# Patient Record
Sex: MALE | Race: WHITE | NOT HISPANIC OR LATINO
[De-identification: names, ages, dates, MRNs, and addresses within clinical notes are randomized per-mention and may not be internally consistent; named-entity substitution may affect disease eponyms.]

---

## 2022-04-21 ENCOUNTER — APPOINTMENT (OUTPATIENT)
Dept: ORTHOPEDIC SURGERY | Facility: CLINIC | Age: 37
End: 2022-04-21
Payer: COMMERCIAL

## 2022-04-21 VITALS — BODY MASS INDEX: 26.11 KG/M2 | WEIGHT: 210 LBS | HEIGHT: 75 IN

## 2022-04-21 DIAGNOSIS — S93.492A SPRAIN OF OTHER LIGAMENT OF LEFT ANKLE, INITIAL ENCOUNTER: ICD-10-CM

## 2022-04-21 DIAGNOSIS — M25.572 PAIN IN LEFT ANKLE AND JOINTS OF LEFT FOOT: ICD-10-CM

## 2022-04-21 PROCEDURE — 73630 X-RAY EXAM OF FOOT: CPT | Mod: LT

## 2022-04-21 PROCEDURE — 73610 X-RAY EXAM OF ANKLE: CPT | Mod: LT

## 2022-04-21 PROCEDURE — L4361: CPT | Mod: LT

## 2022-04-21 PROCEDURE — 99204 OFFICE O/P NEW MOD 45 MIN: CPT

## 2022-04-21 RX ORDER — NAPROXEN 500 MG/1
500 TABLET ORAL
Qty: 30 | Refills: 0 | Status: ACTIVE | COMMUNITY
Start: 2022-04-21 | End: 1900-01-01

## 2022-04-21 NOTE — HISTORY OF PRESENT ILLNESS
[Sudden] : sudden [Intermittent] : intermittent [Walking] : walking [de-identified] : Mr. SPENCER is a 36 year male who presents today for evaluation of their Left ankle injury. He states that three days ago while he was jogging down in [place] he stepped on uneven pavement fell and twisted his left ankle. He noticed pain and swelling mostly on the outside aspect of his ankle. He was seen in the emergency room where X rays revealed no fracture. He was placed in a ankle wrap and was given crutches. He states that overall his pain has been improving he has most of his pain on the outside aspect of his ankle security denies any calf pain or any leg pain he denies any recurrent symptoms or history of ankle or foot sprains or injuries. He does not have any mechanical symptoms of catching or locking of the ankle or foot\par  [] : no [FreeTextEntry5] : onset of pain after foot hit a slope and he stumbled and twisted left ankle on 04/17/22. went to ER, there is no fx. \par swelling in foot, ankle swelling decreased. able to WB [de-identified] : 04/17/22

## 2022-04-21 NOTE — PHYSICAL EXAM
[de-identified] : General: Well-nourished, well developed female in no apparent distress\par Psych: Clear speech, pleasant mood and affect\par Neurological: Alert and oriented to person, place, and time\par Gait: Antalgic\par Respiratory: Normal respiratory effort\par Skin: No rashes, no lesions, no open skin, no erythema\par \par On examination of the right/left ankle and foot:\par \par Inspection: Mild swelling without ecchymosis over the lateral aspect of the ankle. No erythema noted. Hindfoot alignment is anatomic valgus.\par \par Palpation: They are tender over the anterior ankle joint line. Tenderness over the anterior lateral ankle over the area of the ATFL and CFL. They are tender medially over the distal tip of the medial malleolus and the deltoid ligament. They are also mildly tender over the lateral malleolus. There is no tenderness over the proximal, mid shaft tibia or fibula or syndesmosis. The leg compartments are soft and nontender. The calf is soft and nontender with a downgoing Mesa test. There is mild tenderness over the peroneal tendons which are stable. They are mildly tender over the distal syndesmosis. Negative compression test of the syndesmosis. Non-tender over the fibula head. No pain over the course of the Achilles, or posterior tibial tendons. Non-tender over the sinus tarsi.\par \par On examination of the foot: No tenderness over the transverse tarsal joints, TMT or the Lisfranc joints on palpation and stress examination. No tenderness over the calcaneus, navicular, cuboid, cuneiforms, or metatarsals shafts. Full range of motion through the MTP joints. No pain on examination of the toes.\par \par ROM: 5 degrees of dorsiflexion, 25 degrees of plantar flexion, 15 degrees of inversion and 10 degrees of eversion with discomfort over the lateral ankle.\par \par Muscle Strength: 4/5 strength with resisted dorsiflexion, plantar flexion, inversion and eversion with pain on resisted eversion.\par \par Stability: No instability noted with varus/valgus stress, with slight laxity of the lateral ligament complex\par \par Tests: Anterior and posterior drawer test negative. Negative syndesmotic squeeze test.\par \par Neurologic Exam Sensation grossly intact to light touch throughout. 2+ Achilles tendon reflexes with downgoing Babinski test, no clonus\par \par Vascular: 2+ dorsalis pedis and posterior tibial pulses with brisk capillary refill in all toes.\par No pitting edema, no varicosities on bilateral lower extremities.\par \par XRAYS (3v Ankle and foot ): Xrays done today in the office were 3 views of the foot and 3 views of the ankle weight bearing with no limitation to today's study which reveal no acute fractures or dislocations seen. The ankle mortise is reduced and well aligned. There is no widening of the syndesmosis. No evidence of a osteochondral defect. The midfoot and forefoot joints are reduced and well aligned. Os trigonum noted\par

## 2022-05-05 ENCOUNTER — APPOINTMENT (OUTPATIENT)
Dept: ORTHOPEDIC SURGERY | Facility: CLINIC | Age: 37
End: 2022-05-05
Payer: COMMERCIAL

## 2022-05-05 DIAGNOSIS — M79.662 PAIN IN LEFT LOWER LEG: ICD-10-CM

## 2022-05-05 DIAGNOSIS — S93.402A SPRAIN OF UNSPECIFIED LIGAMENT OF LEFT ANKLE, INITIAL ENCOUNTER: ICD-10-CM

## 2022-05-05 PROCEDURE — 73610 X-RAY EXAM OF ANKLE: CPT | Mod: LT

## 2022-05-05 PROCEDURE — 73630 X-RAY EXAM OF FOOT: CPT | Mod: LT

## 2022-05-05 PROCEDURE — 99213 OFFICE O/P EST LOW 20 MIN: CPT

## 2022-05-05 PROCEDURE — 93971 EXTREMITY STUDY: CPT

## 2022-05-05 NOTE — HISTORY OF PRESENT ILLNESS
[Dull/Aching] : dull/aching [Intermittent] : intermittent [Rest] : rest [Standing] : standing [de-identified] : Mr. SPENCER is a 36 year male who presents today for evaluation of their Left ankle injury and spraying follow up. He has been protected and immobilized in a Cam Walker boot. He feels that his pain has improved significantly. He has no pain walking in the boot. He does notice some minimal soreness along the outside aspect of the ankle when walking without the boot he does feel a sense of stiffness of the ankle and foot he denies any catching or locking of the ankle and denies any calf or leg pain [] : no [FreeTextEntry1] : left ankle [FreeTextEntry5] : pt reports improvement in pain. soreness  behind ankle, pain on top of foot. would like to come out of boot [FreeTextEntry6] : soreness

## 2022-05-05 NOTE — PHYSICAL EXAM
[de-identified] : General: Well-nourished, well developed female in no apparent distress\par Psych: Clear speech, pleasant mood and affect\par Neurological: Alert and oriented to person, place, and time\par Gait: antalgic\par Respiratory: Normal respiratory effort\par Skin: No rashes, no lesions, no open skin, no ecchymosis, no erythema\par \par Examination: On examination of the ankle and foot: There is mild swelling over the lateral ankle and hindfoot without ecchymosis or erythema noted. Hindfoot alignment is in anatomic valgus. The calf is soft and tender with a downgoing Mesa test. There is no pain over the proximal mid shaft or distal tibia or fibula. The leg compartments as well as the calf are soft and nontender. No pain of the ankle joint line or syndesmosis. There is no pain over the medial or lateral malleolus. There is residual tenderness over the area of the ATFL and CFL. No pain over the deltoid ligament is noted. \par He is tender over the achilles tendon.There is no pain over the course of the peroneal or posterior tibial tendons. No peroneal instability or subluxation is noted.   \par No instability on varus or valgus stress and anterior drawer testing. \par There is a good range of motion with dorsiflexion of 10 degrees, plantar flexion 25 degrees, inversion and eversion of 15 degrees with 4+/5 strength throughout all muscle groups. There is no pain over the calcaneus, subtalar joint, transverse tarsal joints, TMT or the Lisfranc joints. No pain over the navicular, cuboid, cuneiforms metatarsal shafts, MTP joints or on examination of the toes. Sensation is grossly intact throughout to light touch and there is 2+ DP/PT pulses.\par X-rays done today in the office 3 views of the ankle weight-bearing with no limitations reveals no acute fractures or dislocations. The ankle mortise and syndesmosis are reduced and well aligned and well maintained\par

## 2022-05-05 NOTE — ASSESSMENT
[FreeTextEntry1] : After their examination today in the office and review of their radiographs they are doing very well healing and recovering from their ankle injury and ankle sprain with a period of protection and immobilization in a cam walker boot. I would reccommed 1o-14 days of further protection in his boot and then transition them out of the cam walker boot into a supportive lace up ankle brace as I do think they would require further protection and support of the ankle while they continue to heal and recover from their ankle sprain. Time was taken today in the office. The brace was manipulated and fitted to them today in the office and demonstrated to them how to apply the brace and how to remove the brace. I would like them to wear the brace full-time for the next 2 weeks and a good supportive lace up sneaker or shoe. In 2 weeks if they are pain-free they can remove the brace and return to regular shoe wear. If they are pain-free they can then slowly and gradually begin to return to gym and sports activities by wearing the brace for the first 3 to 4 weeks during their return to gym and sports activities.\par He was also given a script for physical therapy to help with strengthening and stabilization of his ankle\par I have also given him a prescription to start physical therapy to work on range of motion strengthening and stabilizatoin, I have also ordered an MRI to evaluate his ankle injury and rule peroneal tendon injury and his achilles tendon as well as a duplex exam to rule out a DVT

## 2022-05-10 ENCOUNTER — FORM ENCOUNTER (OUTPATIENT)
Age: 37
End: 2022-05-10

## 2022-05-11 ENCOUNTER — APPOINTMENT (OUTPATIENT)
Dept: MRI IMAGING | Facility: CLINIC | Age: 37
End: 2022-05-11
Payer: COMMERCIAL

## 2022-05-11 PROCEDURE — 73721 MRI JNT OF LWR EXTRE W/O DYE: CPT | Mod: LT

## 2022-05-16 ENCOUNTER — APPOINTMENT (OUTPATIENT)
Dept: ORTHOPEDIC SURGERY | Facility: CLINIC | Age: 37
End: 2022-05-16
Payer: COMMERCIAL

## 2022-05-16 PROCEDURE — 99213 OFFICE O/P EST LOW 20 MIN: CPT

## 2022-05-16 NOTE — PHYSICAL EXAM
[de-identified] : General: Well-nourished, well developed female in no apparent distress\par Psych: Clear speech, pleasant mood and affect\par Neurological: Alert and oriented to person, place, and time\par Gait: Normal\par Respiratory: Normal respiratory effort\par Skin: No rashes, no lesions, no open skin, no ecchymosis, no erythema\par \par Examination: On examination of the ankle and foot: There is mild swelling over the lateral ankle and hindfoot without ecchymosis or erythema noted. Hindfoot alignment is in anatomic valgus. The calf is soft and nontender with a downgoing Mesa test. There is no pain over the proximal mid shaft or distal tibia or fibula. The leg compartments as well as the calf are soft and nontender. No pain of the ankle joint line or syndesmosis. There is no pain over the medial or lateral malleolus. There is residual tenderness over the area of the ATFL and CFL. No pain over the deltoid ligament is noted. \par There is no pain over the course of the Achilles, peroneal or posterior tibial tendons. No peroneal instability or subluxation is noted.   \par No instability on varus or valgus stress and anterior drawer testing. \par There is a good range of motion with dorsiflexion of 10 degrees, plantar flexion 25 degrees, inversion and eversion of 15 degrees with 4+/5 strength throughout all muscle groups. There is no pain over the calcaneus, subtalar joint, transverse tarsal joints, TMT or the Lisfranc joints. No pain over the navicular, cuboid, cuneiforms metatarsal shafts, MTP joints or on examination of the toes. Sensation is grossly intact throughout to light touch and there is 2+ DP/PT pulses.\par \par

## 2022-05-16 NOTE — ASSESSMENT
[FreeTextEntry1] : After his examination today in the office and review of his radiographs as well as his MRI which shows tearing of the lateral ankle ligament is complex which appears to be healing and he appears to be doing well. I would recommend still wearing a supportive lace up ankle brace on a daily basis and removing the brace around his home which he is now comfortable doing. I would like him to continue to work with his physical therapist to work on range of motion strengthening and stabilization and proprioception and balancing of the ankle and foot. He can start with an elliptical  as well as a stationary bike as he does enjoy exercising on a daily basis. I would like him to refrain from running and jumping type of activities. I would like to see him back in four to five weeks for repeat clinical and X ray examination. We also discussed that there are some patients with moderate to severe ankle sprains and tearing of the ligaments that do not heal well who can have chronic pain or symptoms of instability or recurrent ankle sprains that may require surgery in the future.

## 2022-05-16 NOTE — HISTORY OF PRESENT ILLNESS
[3] : 3 [Dull/Aching] : dull/aching [] : yes [Full time] : Work status: full time [de-identified] : Mr. SPENCER is a 36 year male who presents today for evaluation of their  Follow up For his ankle sprain. He states that he is doing much better period he has been walking comfortably in regular shoes with a supportive ankle brace. he does notice a significant improvement in his pain. He has recently started physical therapy and feels that his physical therapy has been helping him with decreasing his pain and improving his strength and range of motion. He denies any catching or locking of the ankle or foot and he denies any leg or calf pain [FreeTextEntry1] : LT Ankle  [FreeTextEntry5] : States Ankle is doing well and the pain has gone down since last visit. [de-identified] : MRI  [de-identified] : MRI

## 2022-06-21 ENCOUNTER — APPOINTMENT (OUTPATIENT)
Dept: ORTHOPEDIC SURGERY | Facility: CLINIC | Age: 37
End: 2022-06-21
Payer: COMMERCIAL

## 2022-06-21 DIAGNOSIS — S93.492D SPRAIN OF OTHER LIGAMENT OF LEFT ANKLE, SUBSEQUENT ENCOUNTER: ICD-10-CM

## 2022-06-21 DIAGNOSIS — S93.422A SPRAIN OF DELTOID LIGAMENT OF LEFT ANKLE, INITIAL ENCOUNTER: ICD-10-CM

## 2022-06-21 PROCEDURE — 73630 X-RAY EXAM OF FOOT: CPT | Mod: LT

## 2022-06-21 PROCEDURE — 99213 OFFICE O/P EST LOW 20 MIN: CPT

## 2022-06-21 PROCEDURE — 73610 X-RAY EXAM OF ANKLE: CPT | Mod: LT

## 2022-06-21 NOTE — HISTORY OF PRESENT ILLNESS
[de-identified] : Is here for follow-up of his ankle sprain and ankle injury.  He is currently doing very well.  He has started to transition out of the ankle brace.  He does feel that physical therapy has been helping him with improving his range of motion and strength and stability and his pain has been gradually decreasing.  He denies any mechanical symptoms of catching or locking of the ankle or foot.  He denies any leg or calf pain. [FreeTextEntry5] : Wilton is here today to F/U on his LT Ankle.\par No real pain and the ankle has been doing well.\par Going to PT and is wearing the ankle brace. [de-identified] : PT Twice a week

## 2022-06-21 NOTE — PHYSICAL EXAM
[de-identified] : General: Well-nourished, well developed female in no apparent distress\par Psych: Clear speech, pleasant mood and affect\par Neurological: Alert and oriented to person, place, and time\par Gait: Normal\par Respiratory: Normal respiratory effort\par Skin: No rashes, no lesions, no open skin, no ecchymosis, no erythema\par \par Examination: On examination of the ankle and foot: There is mild swelling over the lateral ankle and hindfoot without ecchymosis or erythema noted. Hindfoot alignment is in anatomic valgus. The calf is soft and nontender with a downgoing Mesa test. There is no pain over the proximal mid shaft or distal tibia or fibula. The leg compartments as well as the calf are soft and nontender. No pain of the ankle joint line or syndesmosis. There is no pain over the medial or lateral malleolus. There is residual tenderness over the area of the ATFL and CFL. No pain over the deltoid ligament is noted. \par There is no pain over the course of the Achilles, peroneal or posterior tibial tendons. No peroneal instability or subluxation is noted.   \par No instability on varus or valgus stress and anterior drawer testing. \par There is a good range of motion with dorsiflexion of 10 degrees, plantar flexion 25 degrees, inversion and eversion of 15 degrees with 4+/5 strength throughout all muscle groups. There is no pain over the calcaneus, subtalar joint, transverse tarsal joints, TMT or the Lisfranc joints. No pain over the navicular, cuboid, cuneiforms metatarsal shafts, MTP joints or on examination of the toes. Sensation is grossly intact throughout to light touch and there is 2+ DP/PT pulses.\par X-rays done today in the office 3 views of the ankle and foot which reveals no acute fractures or dislocations the ankle mortise syndesmosis hindfoot and midfoot joints are reduced and well aligned\par \par

## 2022-06-21 NOTE — ASSESSMENT
[FreeTextEntry1] : After his examination today in the office and review of his radiographs he is doing very well healing and recovering from his ankle sprain.  He does feel that physical therapy has been helping him significantly and he would like to continue with physical therapy and a new prescription was given to him to help him fully transition out of the ankle brace with his daily activities I do think he can continue to progress with his physical activities with inline exercises such as a stationary bike elliptical  and running on even ground and hopefully in 2 to 3 weeks can return to gym and sports activities with wearing the brace during the first 2 to 3 months of his return to strenuous competitive or contact sports.  I will see him back if he has any pain any limitations or concerns

## 2023-01-11 ENCOUNTER — APPOINTMENT (OUTPATIENT)
Dept: ORTHOPEDIC SURGERY | Facility: CLINIC | Age: 38
End: 2023-01-11
Payer: COMMERCIAL

## 2023-01-11 DIAGNOSIS — Z00.00 ENCOUNTER FOR GENERAL ADULT MEDICAL EXAMINATION W/OUT ABNORMAL FINDINGS: ICD-10-CM

## 2023-01-11 PROCEDURE — 73030 X-RAY EXAM OF SHOULDER: CPT | Mod: LT

## 2023-01-11 PROCEDURE — 99214 OFFICE O/P EST MOD 30 MIN: CPT

## 2023-01-12 NOTE — IMAGING
[Left] : left shoulder [There are no fractures, subluxations or dislocations. No significant abnormalities are seen] : There are no fractures, subluxations or dislocations. No significant abnormalities are seen [Type 2 acromion] : Type 2 acromion [Calcific density] : Calcific density [de-identified] : The patient is a well appearing 37 year year old male of their stated age.\par Neck is supple & nontender to palpation. Negative Spurling's test.\par \par General: in no acute distress, seated comfortably, moving easily\par Skin: No discoloration, rashes; on palpation skin is dry, \par Neuro: Normal sensation all dermatomes, motor all myotomes\par Vascular: Normal pulses, no edema, normal temperature\par Coordination and balance: Normal\par Psych: normal mood and affect, non pressured speech, alert and oriented x3\par \par Effected Shoulder \par Inspection:\par Scapula Winging: Negative\par Deformity: None\par Erythema: None\par Ecchymosis: None\par Abrasions: None\par Effusion: None\par \par Range of Motion:\par Active Forward Flexion: 160 degrees \par Passive Forward Flexion: 170 degrees \par Active IR : L4\par Passive ER : 30 degrees\par \par Motor Exam:\par Forward Flexion: 4+ out of 5\par Flexion Plane of Scapula: 5 out of 5\par Abduction: 4+ out of 5\par Internal Rotation: 5 out of 5\par External Rotation: 4+ out of 5\par Distal Motor Strength: 5 out of 5\par \par Stability Testing:\par Anterior: 1+\par Posterior: 1+\par Sulcus N: 1+\par Sulcus ER: 1+\par \par Provocative Tests:\par Drop Arm: Negative\par [place]/Impingement: Positive\par [place]: Positive\par X-Arm Adduction: Negative\par Belly Press: Negative\par Bear Hug: Negative\par Lift Off: Negative\par Apprehension: Negative\par Relocation: Negative\par Posterior Load & Shift: Negative\par \par Palpation:\par AC Joint: Nontender\par Clavicle: Nontender\par SC Joint: Nontender\par Bicepital Groove: Positive\par Coracoid Process: Nontender\par Pectoralis Minor Tendon: Nontender\par Pectoralis Major Tendon: Nontender & palpably intact\par Latissimus Dorsi: Nontender \par Proximal Humerus: Positive\par Scapula Body: Nontender\par Medial Scapula Boarder: Nontender\par Scapula Spine: Nontender\par \par Neurologic Exam: Sensation to Light Touch:\par Axillary: Grossly intact\par Ulnar: Grossly intact\par Radial: Grossly intact\par Median: Grossly intact\par Other:  N/A\par \par Circulatory/Pulses:\par Ulnar: 2+\par Radial: 2+\par Other Pertinent Findings: None\par \par Contralateral Shoulder\par Range of Motion:\par Active Forward Flexion: 180 degrees \par Active Abduction: 180 degrees \par Passive Forward Flexion: 180 degrees \par Passive Abduction: 180 degrees \par ER @ 90 degrees: 90 degrees\par IR @ 90 degrees: 45 degrees\par ER @ 0 degrees: 50 degrees\par \par Motor Exam:\par Forward Flexion: 5 out of 5\par Flexion Plane of Scapula: 5 out of 5\par Abduction: 5 out of 5\par Internal Rotation: 5 out of 5\par External Rotation: 5 out of 5\par Distal Motor Strength: 5 out of 5\par \par Stability Testing:\par Anterior: 1+\par Posterior: 1+\par Sulcus N: 1+\par Sulcus ER: 1+\par \par Other Pertinent Findings: None\par   [FreeTextEntry1] : calcific density over greater tuberosity

## 2023-01-12 NOTE — HISTORY OF PRESENT ILLNESS
[2] : 2 [Dull/Aching] : dull/aching [Constant] : constant [Meds] : meds [Ice] : ice [Bending forward] : bending forward [Extending back] : extending back [de-identified] : 01/11/2023: 37 year RHD male is here today as a new patient for L shoulder pain since September 2022\par has taken tylenol and nsaids\par Pt denies acute trauma, no n/t, no n/t radiating to hand on affected side. \par States pain started shortly after receiving IM immunizations in his L deltoid\par Pt not taking NSAIDs, no therapeutic injections, not doing PT.  [] : no [FreeTextEntry5] : RASHAD is here for Left Shoulder, pt states approx 5 months had shot given ever since than states having constant pain on the Left Shoulder. Pt states having when lift up objects the pain is always there. Pt states having to take Tylenol and Advil but the pain does not go way

## 2023-01-12 NOTE — DISCUSSION/SUMMARY
[de-identified] : Assessment: The patient is a 37 year old male with L shoulder pain and physical exam findings consistent with calcific tendonitis L shoulder.\par \par Patient and I discussed their symptoms. Discussed findings of today's exam and possible causes of patient's pain. Educated patient on their most probable diagnosis. Reviewed possible courses of treatment, and we collaboratively decided best course of treatment at this time will include:\par \par 1. MRI L shoulder\par \par The patient's current medication management of their orthopedic diagnosis was reviewed today: \par \par (1) We discussed a comprehensive treatment plan that included possible pharmaceutical management involving the use of prescription strength medications including but not limited to options such as oral Naprosyn 500mg BID, once daily Meloxicam 15 mg, or 500-650 mg Tylenol versus over the counter oral medications and topical prescription NSAID Pennsaid vs over the counter Voltaren gel. \par \par (2) There is a moderate risk of morbidity with further treatment, especially from use of prescription strength medications and possible side effects of these medications which include upset stomach with oral medications, skin reactions to topical medications and cardiac/renal issues with long term use. \par \par (3) I recommended that the patient follow-up with their medical physician to discuss any significant specific potential issues with long term medication use such as interactions with current medications or with exacerbation of underlying medical comorbidities. \par \par (4) The benefits and risks associated with use of injectable, oral or topical, prescription and over the counter anti-inflammatory medications were discussed with the patient. The patient voiced understanding of the risks including but not limited to bleeding, stroke, kidney dysfunction, heart disease, and were referred to the black box warning label for further information.\par \par Prior to appointment and during encounter with patient extensive medical records were reviewed including but not limited to, hospital records, out patient records, imaging results, and lab data. During this appointment the patient was examined, diagnoses were discussed and explained in a face to face manner. In addition extensive time was spent reviewing aforementioned diagnostic studies. Counseling including abnormal image results, differential diagnoses, treatment options, risk and benefits, lifestyle changes, current condition, and current medications was performed. Patient's comments, questions, and concerns were address and patient verbalized understanding.\par \par Follow up after MRI. \par \par History, physical exam, imaging, assessment and plan documented by Juve Sesay. The documentation recorded by the scribe accurately reflects the service I, Andrew Jaimes MD, personally performed and the decisions made by me.

## 2023-01-17 ENCOUNTER — FORM ENCOUNTER (OUTPATIENT)
Age: 38
End: 2023-01-17

## 2023-01-18 ENCOUNTER — APPOINTMENT (OUTPATIENT)
Dept: MRI IMAGING | Facility: CLINIC | Age: 38
End: 2023-01-18

## 2023-01-18 ENCOUNTER — APPOINTMENT (OUTPATIENT)
Dept: MRI IMAGING | Facility: CLINIC | Age: 38
End: 2023-01-18
Payer: COMMERCIAL

## 2023-01-18 PROCEDURE — 73221 MRI JOINT UPR EXTREM W/O DYE: CPT | Mod: LT

## 2023-01-25 ENCOUNTER — APPOINTMENT (OUTPATIENT)
Dept: ORTHOPEDIC SURGERY | Facility: CLINIC | Age: 38
End: 2023-01-25
Payer: COMMERCIAL

## 2023-01-25 PROCEDURE — 99214 OFFICE O/P EST MOD 30 MIN: CPT

## 2023-01-25 RX ORDER — DICLOFENAC SODIUM 75 MG/1
75 TABLET, DELAYED RELEASE ORAL TWICE DAILY
Qty: 60 | Refills: 0 | Status: ACTIVE | COMMUNITY
Start: 2023-01-25 | End: 1900-01-01

## 2023-01-25 RX ORDER — METHYLPREDNISOLONE 4 MG/1
4 TABLET ORAL
Qty: 1 | Refills: 0 | Status: ACTIVE | COMMUNITY
Start: 2023-01-25 | End: 1900-01-01

## 2023-02-01 NOTE — HISTORY OF PRESENT ILLNESS
[3] : 3 [Dull/Aching] : dull/aching [Throbbing] : throbbing [Intermittent] : intermittent [de-identified] : 01/25/2023 \par \lupe SOLORZANO is presenting today for followup. Pain and symptoms are similar to the previous visit. He denies any numbness/tingling/fevers/chills. He underwent an MRI since last visit, and is here to discuss the imaging results.  [] : no [FreeTextEntry1] : Left Shoulder  [FreeTextEntry5] : RASHAD is here for Left Shoulder,pt states no changes since last visit. Pt states feels like it's worsen specially when trying to move the left shoulder. Pt states having some aching

## 2023-02-01 NOTE — DATA REVIEWED
[MRI] : MRI [Left] : left [Shoulder] : shoulder [Report was reviewed and noted in the chart] : The report was reviewed and noted in the chart [I independently reviewed and interpreted images and report] : I independently reviewed and interpreted images and report [FreeTextEntry1] : 1. 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 Supraspinatus tendinopathy with anterior insertional fraying. 10 mm calcific subdeltoid bursitis overlying the \par anterior insertion at the humerus.\par 3. Capsular thickening more noted anterior which can be seen with adhesive capsulitis.

## 2023-02-01 NOTE — DISCUSSION/SUMMARY
[de-identified] : We discussed their diagnosis and treatment options at length. We will first attempt conservative treatment with a course of PT and anti-inflammatory medication. The patient was provided with a prescription to work on scapular strengthening and rotator cuff strengthening on the impingement syndrome protocol. We also discussed the possible of a corticosteroid injection in the future in order to help decrease inflammation and pain so that they can perform better therapy.\par \par Instructions: Dx / Natural History\par The patient was advised of the diagnosis.  The natural history of the pathology was explained in full to the patient in layman's terms.  Several different treatment options were discussed and explained in full to the patient including the risks and benefits of both surgical and non-surgical treatments.  All questions and concerns were answered. \par \par RICE\par I explained to the patient that rest, ice, compression, and elevation would benefit them.  They may return to activity after follow-up or when they no longer have any pain.\par \par NSAIDs - OTC\par Patient is to begin over the counter oral anti-inflammatory medications on an as needed basis, as long as there are no medical contraindications.  Patient is counseled on possible GI and blood pressure side effects.\par \par Pain Guide Activities\par The patient was advised to let pain guide the gradual advancement of activities.\par \par Icing\par The patient was advised to apply ice (wrapped in a towel or protective covering) to the area daily (20 minutes at a time, 2-4X/day).\par \par All of the patient's questions were answered to His satisfaction. Diagnoses and potential treatments were reviewed. He agreed with the plan and would like to move forward with it. \par \par Follow up in 6 weeks to re-evaluate progress with therapy

## 2023-02-14 ENCOUNTER — APPOINTMENT (OUTPATIENT)
Dept: ORTHOPEDIC SURGERY | Facility: CLINIC | Age: 38
End: 2023-02-14
Payer: COMMERCIAL

## 2023-02-14 PROCEDURE — 73030 X-RAY EXAM OF SHOULDER: CPT | Mod: RT

## 2023-02-14 PROCEDURE — 99213 OFFICE O/P EST LOW 20 MIN: CPT

## 2023-02-14 NOTE — HISTORY OF PRESENT ILLNESS
[7] : 7 [Dull/Aching] : dull/aching [Intermittent] : intermittent [Meds] : meds [Ice] : ice [Bending forward] : bending forward [Extending back] : extending back [de-identified] : 2/14/23: 36yo RHD male () presents for RIGHT shoulder pain. On 2/3/22, he fell off a chair lift onto his hand, and felt a pop in his shoulder. Pain worst reaching behind his back.\par Taking PO Diclofenac for LEFT shoulder, for which he is seeing Dr. Jaimes.\par \par Hx: none. [] : no [FreeTextEntry5] :  37 year old M is here for Right Shoulder, Pt states he fell when skiing (02/03/2023) states hearing pop sound like on the Right Shoulder, Pt states having hard time trying twist and extending back the Right Shoulder since the injury states using medication for the pain.

## 2023-02-14 NOTE — IMAGING
Called pharmacy- still has not gone through yet. Prior auth still not accepted.   [de-identified] : RIGHT SHOULDER\par No swelling. no TTP.\par FF: 150, ER 50, IR to back pocket.\par shoulder abduction 5/5, forward flexion 4+/5, external rotation 5/5, internal rotation 5/5.\par Sensation intact to light touch.\par + Hawkin's test. \par equivocal Job's test.\par + Speed's test. Negative Yergason's test.\par \par \par XRAYS OF RIGHT SHOULDER: no acute displaced fracture or dislocation.

## 2023-02-14 NOTE — ASSESSMENT
[FreeTextEntry1] : The condition was explained to the patient.\par - MRI R shoulder to evaluate for tear of rotator cuff, biceps tendons.\par - prescribed PT for R shoulder.\par \par F/u with Dr. Jiames after MRI.

## 2023-02-21 ENCOUNTER — FORM ENCOUNTER (OUTPATIENT)
Age: 38
End: 2023-02-21

## 2023-02-22 ENCOUNTER — APPOINTMENT (OUTPATIENT)
Dept: MRI IMAGING | Facility: CLINIC | Age: 38
End: 2023-02-22
Payer: COMMERCIAL

## 2023-02-22 PROCEDURE — 73221 MRI JOINT UPR EXTREM W/O DYE: CPT | Mod: RT

## 2023-02-23 ENCOUNTER — TRANSCRIPTION ENCOUNTER (OUTPATIENT)
Age: 38
End: 2023-02-23

## 2023-03-01 ENCOUNTER — APPOINTMENT (OUTPATIENT)
Dept: ORTHOPEDIC SURGERY | Facility: CLINIC | Age: 38
End: 2023-03-01
Payer: COMMERCIAL

## 2023-03-01 DIAGNOSIS — M77.8 OTHER ENTHESOPATHIES, NOT ELSEWHERE CLASSIFIED: ICD-10-CM

## 2023-03-01 DIAGNOSIS — M75.32 CALCIFIC TENDINITIS OF LEFT SHOULDER: ICD-10-CM

## 2023-03-01 PROCEDURE — 20611 DRAIN/INJ JOINT/BURSA W/US: CPT

## 2023-03-01 PROCEDURE — 99214 OFFICE O/P EST MOD 30 MIN: CPT | Mod: 25

## 2023-03-09 PROBLEM — M75.32 CALCIFIC TENDINITIS OF LEFT SHOULDER: Status: ACTIVE | Noted: 2023-01-11

## 2023-03-09 PROBLEM — M77.8 TENDINITIS OF RIGHT SHOULDER: Status: ACTIVE | Noted: 2023-02-14

## 2023-03-09 NOTE — HISTORY OF PRESENT ILLNESS
[3] : 3 [Dull/Aching] : dull/aching [Throbbing] : throbbing [Intermittent] : intermittent [de-identified] : 03/01/2023 \par \lupe SOLORZANO is presenting today for followup after having seen  for his right shoulder. Pain and symptoms are similar to the previous visit. He denies any numbness/tingling/fevers/chills. He underwent an MRI since last visit, and is here to discuss the imaging results.  [] : no [FreeTextEntry1] : RT buchanan  [FreeTextEntry5] : RASHAD is here for Mri review of the right shoulder, reports on going pain and limited ROM

## 2023-03-09 NOTE — PROCEDURE
[FreeTextEntry3] : Patient Identification \par Name/: Verbal with patient and/or family \par  \par Procedure Verification: \par Procedure confirmed with patient or family/designee \par Consent for procedure: Verbal Consent Given \par Relevant documentation completed, reviewed, and signed \par Clinical indications for procedure confirmed \par  \par Time-out with all members of procedure team immediately prior to procedure: \par Correct patient identified. Agreement on procedure. Correct side and site. \par  \par ULTRASOUND GUIDED SHOULDER SUBACROMIAL INJECTION - LEFT \par After verbal consent and identification of the correct patient and correct site, the posterior left shoulder was prepped using alcohol swabs and betadine. This was allowed time to air dry. After ethyl chloride spray for skin anesthesia, a mixture of 1cc Celestone 6mg/ml, 3cc Lidocaine 1%, and 3cc Bupivacaine 0.5% was injected under ultrasound guidance into the subacromial space from posterior using a sterile 22G needle. Visualization of the needle and placement of the injection was performed without any complications. Ultrasound was used for visualization, precise injection in area of tear, and / or prior failure or difficult injection. The patient tolerated the procedure well. After-care instructions were provided and included instructions to ice the area and to call if redness, pain, or fever develop.

## 2023-03-09 NOTE — DATA REVIEWED
[MRI] : MRI [Shoulder] : shoulder [Report was reviewed and noted in the chart] : The report was reviewed and noted in the chart [I independently reviewed and interpreted images and report] : I independently reviewed and interpreted images and report [Right] : of the right [FreeTextEntry1] : 1. Nondepressed subchondral fracture of the medial humeral head with diffuse marrow edema extending \par throughout the medial head and shaft with suggestion for trabecular microfractures superimposed on the \par marrow edema.\par 2. Fraying and degeneration of the anterior inferior labrum. No definitive labral tear.\par 3. Biceps tendinopathy with tenosynovitis.\par 4. Capsular thickening which can be seen with adhesive capsulitis.\par 5. No rotator cuff tear.

## 2023-03-09 NOTE — DISCUSSION/SUMMARY
[de-identified] : We discussed their diagnosis and treatment options at length. We will first attempt conservative treatment with a course of PT and anti-inflammatory medication. The patient was provided with a prescription to work on scapular strengthening and rotator cuff strengthening on the impingement syndrome protocol. We also discussed the possible of a corticosteroid injection in the future in order to help decrease inflammation and pain so that they can perform better therapy.\par \par Instructions: Dx / Natural History\par The patient was advised of the diagnosis.  The natural history of the pathology was explained in full to the patient in layman's terms.  Several different treatment options were discussed and explained in full to the patient including the risks and benefits of both surgical and non-surgical treatments.  All questions and concerns were answered. \par \par RICE\par I explained to the patient that rest, ice, compression, and elevation would benefit them.  They may return to activity after follow-up or when they no longer have any pain.\par \par NSAIDs - OTC\par Patient is to begin over the counter oral anti-inflammatory medications on an as needed basis, as long as there are no medical contraindications.  Patient is counseled on possible GI and blood pressure side effects.\par \par Pain Guide Activities\par The patient was advised to let pain guide the gradual advancement of activities.\par \par Icing\par The patient was advised to apply ice (wrapped in a towel or protective covering) to the area daily (20 minutes at a time, 2-4X/day).\par \par All of the patient's questions were answered to His satisfaction. Diagnoses and potential treatments were reviewed. He agreed with the plan and would like to move forward with it. \par \par Follow up in 6 weeks to re-evaluate progress with therapy

## 2023-03-09 NOTE — PHYSICAL EXAM
[de-identified] : The patient is a well appearing 37 year year old male of their stated age.\par Neck is supple & nontender to palpation. Negative Spurling's test.\par \par General: in no acute distress, seated comfortably, moving easily\par Skin: No discoloration, rashes; on palpation skin is dry, \par Neuro: Normal sensation all dermatomes, motor all myotomes\par Vascular: Normal pulses, no edema, normal temperature\par Coordination and balance: Normal\par Psych: normal mood and affect, non pressured speech, alert and oriented x3\par \par Effected Shoulder \par Inspection:\par Scapula Winging: Negative\par Deformity: None\par Erythema: None\par Ecchymosis: None\par Abrasions: None\par Effusion: None\par \par Range of Motion:\par Active Forward Flexion: 160 degrees \par Passive Forward Flexion: 170 degrees \par Active IR : L4\par Passive ER : 30 degrees\par \par Motor Exam:\par Forward Flexion: 4+ out of 5\par Flexion Plane of Scapula: 5 out of 5\par Abduction: 4+ out of 5\par Internal Rotation: 5 out of 5\par External Rotation: 4+ out of 5\par Distal Motor Strength: 5 out of 5\par \par Stability Testing:\par Anterior: 1+\par Posterior: 1+\par Sulcus N: 1+\par Sulcus ER: 1+\par \par Provocative Tests:\par Drop Arm: Negative\par Coates/Impingement: Positive\par Everett: Positive\par X-Arm Adduction: Negative\par Belly Press: Negative\par Bear Hug: Negative\par Lift Off: Negative\par Apprehension: Negative\par Relocation: Negative\par Posterior Load & Shift: Negative\par \par Palpation:\par AC Joint: Nontender\par Clavicle: Nontender\par SC Joint: Nontender\par Bicepital Groove: Positive\par Coracoid Process: Nontender\par Pectoralis Minor Tendon: Nontender\par Pectoralis Major Tendon: Nontender & palpably intact\par Latissimus Dorsi: Nontender \par Proximal Humerus: Positive\par Scapula Body: Nontender\par Medial Scapula Boarder: Nontender\par Scapula Spine: Nontender\par \par Neurologic Exam: Sensation to Light Touch:\par Axillary: Grossly intact\par Ulnar: Grossly intact\par Radial: Grossly intact\par Median: Grossly intact\par Other:  N/A\par \par Circulatory/Pulses:\par Ulnar: 2+\par Radial: 2+\par Other Pertinent Findings: None\par \par Contralateral Shoulder\par Range of Motion:\par Active Forward Flexion: 180 degrees \par Active Abduction: 180 degrees \par Passive Forward Flexion: 180 degrees \par Passive Abduction: 180 degrees \par ER @ 90 degrees: 90 degrees\par IR @ 90 degrees: 45 degrees\par ER @ 0 degrees: 50 degrees\par \par Motor Exam:\par Forward Flexion: 5 out of 5\par Flexion Plane of Scapula: 5 out of 5\par Abduction: 5 out of 5\par Internal Rotation: 5 out of 5\par External Rotation: 5 out of 5\par Distal Motor Strength: 5 out of 5\par \par Stability Testing:\par Anterior: 1+\par Posterior: 1+\par Sulcus N: 1+\par Sulcus ER: 1+\par \par Other Pertinent Findings: None\par   [Right] : right shoulder [There are no fractures, subluxations or dislocations. No significant abnormalities are seen] : There are no fractures, subluxations or dislocations. No significant abnormalities are seen [Type 2 acromion] : Type 2 acromion

## 2023-06-12 ENCOUNTER — FORM ENCOUNTER (OUTPATIENT)
Age: 38
End: 2023-06-12

## 2023-06-13 ENCOUNTER — APPOINTMENT (OUTPATIENT)
Dept: MRI IMAGING | Facility: CLINIC | Age: 38
End: 2023-06-13
Payer: COMMERCIAL

## 2023-06-13 ENCOUNTER — APPOINTMENT (OUTPATIENT)
Dept: ORTHOPEDIC SURGERY | Facility: CLINIC | Age: 38
End: 2023-06-13
Payer: COMMERCIAL

## 2023-06-13 VITALS — HEIGHT: 75 IN | WEIGHT: 220 LBS | BODY MASS INDEX: 27.35 KG/M2

## 2023-06-13 DIAGNOSIS — M25.579 PAIN IN UNSPECIFIED ANKLE AND JOINTS OF UNSPECIFIED FOOT: ICD-10-CM

## 2023-06-13 DIAGNOSIS — Z78.9 OTHER SPECIFIED HEALTH STATUS: ICD-10-CM

## 2023-06-13 DIAGNOSIS — M84.362A STRESS FRACTURE, LEFT TIBIA, INITIAL ENCOUNTER FOR FRACTURE: ICD-10-CM

## 2023-06-13 PROCEDURE — 73610 X-RAY EXAM OF ANKLE: CPT | Mod: LT

## 2023-06-13 PROCEDURE — 99214 OFFICE O/P EST MOD 30 MIN: CPT

## 2023-06-13 PROCEDURE — 73721 MRI JNT OF LWR EXTRE W/O DYE: CPT | Mod: LT

## 2023-06-13 NOTE — PHYSICAL EXAM
[Left] : left foot and ankle [NL (40)] : plantar flexion 40 degrees [NL 30)] : inversion 30 degrees [NL (20)] : eversion 20 degrees [5___] : eversion 5[unfilled]/5 [2+] : dorsalis pedis pulse: 2+ [] : patient ambulates without assistive device [TWNoteComboBox7] : dorsiflexion 15 degrees

## 2023-06-13 NOTE — ASSESSMENT
[FreeTextEntry1] : wbat\par cam boot - -has at home\par mri to eval for stress fx\par further plan pending mri -- patient to f/up with provider in [place] as that is more convenient. he will see me again if needed

## 2023-06-13 NOTE — HISTORY OF PRESENT ILLNESS
[8] : 8 [4] : 4 [Throbbing] : throbbing [de-identified] : 06/13/2023: 4 days ankle pain assoc w/ activity. no specific injiury. has sprain 1 year ago tx w/ dr lim form which he recovered. no prior ankle probs. denies dm/tob. 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

## 2023-06-14 ENCOUNTER — APPOINTMENT (OUTPATIENT)
Dept: ORTHOPEDIC SURGERY | Facility: CLINIC | Age: 38
End: 2023-06-14
Payer: COMMERCIAL

## 2023-06-14 VITALS — WEIGHT: 220 LBS | BODY MASS INDEX: 27.35 KG/M2 | HEIGHT: 75 IN

## 2023-06-14 DIAGNOSIS — M76.62 ACHILLES TENDINITIS, LEFT LEG: ICD-10-CM

## 2023-06-14 DIAGNOSIS — M76.812 ANTERIOR TIBIAL SYNDROME, LEFT LEG: ICD-10-CM

## 2023-06-14 PROCEDURE — 99214 OFFICE O/P EST MOD 30 MIN: CPT

## 2023-06-16 NOTE — DISCUSSION/SUMMARY
[de-identified] : Assessment: The patient is a 37 year old male with LT Ankle Pain and physical exam findings consistent with Anterior Tibialis Tendonitis \par \par Patient and I discussed their symptoms. Discussed findings of today's exam and possible causes of patient's pain. Educated patient on their most probable diagnosis. Reviewed possible courses of treatment, and we collaboratively decided best course of treatment at this time will include:\par \par 1. Start PT and HEP\par 2. Apply ice to affected area 3x a day for 20 min\par 3. Start Diclofenac as prescribed\par 4.Follow up with Dr. Jerome\par \par The patient's current medication management of their orthopedic diagnosis was reviewed today: \par \par (1) We discussed a comprehensive treatment plan that included possible pharmaceutical management involving the use of prescription strength medications including but not limited to options such as oral Naprosyn 500mg BID, once daily Meloxicam 15 mg, or 500-650 mg Tylenol versus over the counter oral medications and topical prescription NSAID Pennsaid vs over the counter Voltaren gel. \par \par (2) There is a moderate risk of morbidity with further treatment, especially from use of prescription strength medications and possible side effects of these medications which include upset stomach with oral medications, skin reactions to topical medications and cardiac/renal issues with long term use. \par \par (3) I recommended that the patient follow-up with their medical physician to discuss any significant specific potential issues with long term medication use such as interactions with current medications or with exacerbation of underlying medical comorbidities. \par \par (4) The benefits and risks associated with use of injectable, oral or topical, prescription and over the counter anti-inflammatory medications were discussed with the patient. The patient voiced understanding of the risks including but not limited to bleeding, stroke, kidney dysfunction, heart disease, and were referred to the black box warning label for further information.\par \par Prior to appointment and during encounter with patient extensive medical records were reviewed including but not limited to, hospital records, out patient records, imaging results, and lab data. During this appointment the patient was examined, diagnoses were discussed and explained in a face to face manner. In addition extensive time was spent reviewing aforementioned diagnostic studies. Counseling including abnormal image results, differential diagnoses, treatment options, risk and benefits, lifestyle changes, current condition, and current medications was performed. Patient's comments, questions, and concerns were address and patient verbalized understanding.\par \par Follow up as needed

## 2023-06-16 NOTE — HISTORY OF PRESENT ILLNESS
[8] : 8 [4] : 4 [Throbbing] : throbbing [de-identified] : 06/14/23: RASHAD is a 37 year old [right] hand dominant M who presents today complaining of LT Ankle Pain\par Date of Injury/Onset: 4 days ago\par Pain:    At Rest: 4/10\par With Activity:  8/10 \par Mechanism of injury: No Specific Injury\par This is [not] a Work Related Injury being treated under Worker's Compensation.\par This is [not] an athletic injury occurring associated with an interscholastic or organized sports team.\par Quality of symptoms: Painful with activities, Throbbing. Pain can radiate up the shin\par Improves with: Minimal with Rest\par Worse with: Activity, Tender to palpation\par Prior treatment: Dr. 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 no specific injiury. has sprain 1 year ago tx w/ dr lim form which he recovered. no prior ankle probs. denies dm/tob. 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

## 2023-06-16 NOTE — PHYSICAL EXAM
[NL (40)] : plantar flexion 40 degrees [NL 30)] : inversion 30 degrees [NL (20)] : eversion 20 degrees [5___] : [facility] 5[unfilled]/5 [2+] : posterior tibialis pulse: 2+ [Normal] : saphenous nerve sensation normal [] : patient ambulates without assistive device [Left] : left ankle [There are no fractures, subluxations or dislocations. No significant abnormalities are seen] : There are no fractures, subluxations or dislocations. No significant abnormalities are seen

## 2023-06-16 NOTE — DATA REVIEWED
[Left] : left [MRI] : MRI [Ankle] : ankle [Report was reviewed and noted in the chart] : The report was reviewed and noted in the chart [I independently reviewed and interpreted images and report] : I independently reviewed and interpreted images and report [FreeTextEntry1] : 1. No evidence for stress fracture.\par 2. Diffuse soft tissue edema which can be seen with trauma, cellulitis, or venous insufficiency.\par 3. Chronic tear of the lateral ligaments.\par 4. Scarring of the tarsal sinus ligament and fat, which can be seen with sinus tarsi syndrome.\par 5. 5 mm retrocalcaneal spur with no fracture or Achilles tear. Achilles peritendinitis with infiltration of Kager's \par fat pad.\par 6. Peroneal tenosynovitis.

## 2025-07-31 ENCOUNTER — APPOINTMENT (OUTPATIENT)
Dept: ORTHOPEDIC SURGERY | Facility: CLINIC | Age: 40
End: 2025-07-31
Payer: COMMERCIAL

## 2025-07-31 DIAGNOSIS — M54.16 RADICULOPATHY, LUMBAR REGION: ICD-10-CM

## 2025-07-31 PROCEDURE — 72110 X-RAY EXAM L-2 SPINE 4/>VWS: CPT

## 2025-07-31 PROCEDURE — 72170 X-RAY EXAM OF PELVIS: CPT

## 2025-07-31 PROCEDURE — 99214 OFFICE O/P EST MOD 30 MIN: CPT

## 2025-07-31 RX ORDER — METHYLPREDNISOLONE 4 MG/1
4 TABLET ORAL
Qty: 1 | Refills: 0 | Status: ACTIVE | COMMUNITY
Start: 2025-07-31 | End: 1900-01-01

## 2025-07-31 RX ORDER — GABAPENTIN 100 MG/1
100 CAPSULE ORAL
Qty: 60 | Refills: 1 | Status: ACTIVE | COMMUNITY
Start: 2025-07-31 | End: 1900-01-01

## 2025-07-31 RX ORDER — MELOXICAM 15 MG/1
15 TABLET ORAL DAILY
Qty: 20 | Refills: 0 | Status: ACTIVE | COMMUNITY
Start: 2025-07-31 | End: 1900-01-01